# Patient Record
Sex: MALE | Race: WHITE | NOT HISPANIC OR LATINO | Employment: FULL TIME | URBAN - METROPOLITAN AREA
[De-identification: names, ages, dates, MRNs, and addresses within clinical notes are randomized per-mention and may not be internally consistent; named-entity substitution may affect disease eponyms.]

---

## 2021-01-11 ENCOUNTER — OFFICE VISIT (OUTPATIENT)
Dept: FAMILY MEDICINE CLINIC | Facility: CLINIC | Age: 51
End: 2021-01-11

## 2021-01-11 VITALS
HEART RATE: 77 BPM | TEMPERATURE: 97.2 F | SYSTOLIC BLOOD PRESSURE: 128 MMHG | OXYGEN SATURATION: 94 % | WEIGHT: 315 LBS | DIASTOLIC BLOOD PRESSURE: 82 MMHG

## 2021-01-11 DIAGNOSIS — Z29.9 PREVENTIVE MEASURE: ICD-10-CM

## 2021-01-11 DIAGNOSIS — Z12.5 ENCOUNTER FOR SCREENING FOR MALIGNANT NEOPLASM OF PROSTATE: ICD-10-CM

## 2021-01-11 DIAGNOSIS — Z00.01 ENCOUNTER FOR WELL ADULT EXAM WITH ABNORMAL FINDINGS: Primary | ICD-10-CM

## 2021-01-11 PROCEDURE — 99386 PREV VISIT NEW AGE 40-64: CPT | Performed by: FAMILY MEDICINE

## 2021-01-11 RX ORDER — ALBUTEROL SULFATE 90 UG/1
AEROSOL, METERED RESPIRATORY (INHALATION)
COMMUNITY
Start: 2020-11-02 | End: 2021-01-11 | Stop reason: SDUPTHER

## 2021-01-11 RX ORDER — ALBUTEROL SULFATE 90 UG/1
2 AEROSOL, METERED RESPIRATORY (INHALATION) EVERY 4 HOURS PRN
Qty: 18 G | Refills: 2 | Status: SHIPPED | OUTPATIENT
Start: 2021-01-11

## 2021-01-11 NOTE — PROGRESS NOTES
Subjective   Max Dougherty is a 50 y.o. male.     He is in as a new patient. He has not had a physician for about 3 years. He has a history of asthma. He has been previously well controlled on Advair and occasional albuterol. He has not had any Advair for over a year. He has not had any chest pain but he has needed more albuterol without the Advair. He has not had any fever or illness. He has not had any issue with bowel or bladder function, but he has not had a colonoscopy, either. He has not had any issue with dizziness or lightheadedness. He has not had any issue with sleep or mood.          /82 (BP Location: Left arm, Patient Position: Sitting, Cuff Size: Large Adult)   Pulse 77   Temp 97.2 °F (36.2 °C) (Temporal)   Wt (!) 159 kg (351 lb)   SpO2 94%       Chief Complaint   Patient presents with   • Med Refill     refills on both inhalers           Current Outpatient Medications:   •  albuterol sulfate  (90 Base) MCG/ACT inhaler, , Disp: , Rfl:   •  fluticasone-salmeterol (Advair Diskus) 100-50 MCG/DOSE DISKUS, ADVAIR DISKUS 100-50 MCG/DOSE AEPB, Disp: , Rfl:         The following portions of the patient's history were reviewed and updated as appropriate: allergies, current medications, past family history, past medical history, past social history, past surgical history and problem list.    Review of Systems   Constitutional: Negative for activity change, fatigue and fever.   HENT: Negative for congestion, sinus pressure, sinus pain, sore throat and trouble swallowing.    Eyes: Negative for visual disturbance.   Respiratory: Negative for chest tightness, shortness of breath and wheezing.    Cardiovascular: Negative for chest pain.   Gastrointestinal: Negative for abdominal distention, abdominal pain, constipation, diarrhea, nausea and vomiting.   Genitourinary: Negative for difficulty urinating and dysuria.   Musculoskeletal: Negative for back pain and neck pain.   Neurological: Negative for  dizziness, syncope and light-headedness.   Psychiatric/Behavioral: Negative for agitation, hallucinations, sleep disturbance and suicidal ideas.       Objective   Physical Exam  Vitals signs and nursing note reviewed.   Constitutional:       Appearance: He is obese.   HENT:      Right Ear: Tympanic membrane and ear canal normal.      Left Ear: Tympanic membrane and ear canal normal.      Mouth/Throat:      Pharynx: Oropharynx is clear. No oropharyngeal exudate.   Neck:      Musculoskeletal: Neck supple.   Cardiovascular:      Rate and Rhythm: Normal rate and regular rhythm.      Heart sounds: Normal heart sounds.   Pulmonary:      Effort: Pulmonary effort is normal.      Breath sounds: No wheezing or rales.   Abdominal:      General: Bowel sounds are normal.      Palpations: Abdomen is soft.      Tenderness: There is no abdominal tenderness. There is no guarding.   Musculoskeletal:         General: No swelling.      Right lower leg: No edema.      Left lower leg: No edema.   Skin:     Findings: No rash.   Neurological:      General: No focal deficit present.      Mental Status: He is alert and oriented to person, place, and time.      Gait: Gait normal.   Psychiatric:         Mood and Affect: Mood normal.           Assessment/Plan   Problems Addressed this Visit     None      Visit Diagnoses     Encounter for well adult exam with abnormal findings    -  Primary    Relevant Orders    Comprehensive metabolic panel    Lipid panel    Body mass index (BMI) of 40.0 to 44.9 in adult (CMS/[facility])        Relevant Orders    Ambulatory Referral to Sleep Medicine    Preventive measure        Relevant Orders    Ambulatory Referral For Screening Colonoscopy    Encounter for screening for malignant neoplasm of prostate        Relevant Orders    PSA SCREENING      Diagnoses       Codes Comments    Encounter for well adult exam with abnormal findings    -  Primary ICD-10-CM: Z00.01  ICD-9-CM: V70.0     Body mass index (BMI) of 40.0 to  44.9 in adult (CMS/HCC)     ICD-10-CM: Z68.41  ICD-9-CM: V85.41     Preventive measure     ICD-10-CM: Z29.9  ICD-9-CM: V07.9     Encounter for screening for malignant neoplasm of prostate     ICD-10-CM: Z12.5  ICD-9-CM: V76.44           I will get him back on his Advair and albuterol  I will get him a colonoscopy   I will update fasting labs  I will see back in a year, sooner if needed  We discussed diet and lifestyle / exercise changes that he needs to implement now and he was educated on proper techniques  He was advised to get Covid vaccine when offered

## 2021-01-15 ENCOUNTER — LAB (OUTPATIENT)
Dept: FAMILY MEDICINE CLINIC | Facility: CLINIC | Age: 51
End: 2021-01-15

## 2021-01-15 LAB
ALBUMIN SERPL-MCNC: 4.7 G/DL (ref 3.5–5.2)
ALBUMIN/GLOB SERPL: 1.9 G/DL
ALP SERPL-CCNC: 82 U/L (ref 39–117)
ALT SERPL W P-5'-P-CCNC: 27 U/L (ref 1–41)
ANION GAP SERPL CALCULATED.3IONS-SCNC: 8.5 MMOL/L (ref 5–15)
AST SERPL-CCNC: 19 U/L (ref 1–40)
BILIRUB SERPL-MCNC: 0.6 MG/DL (ref 0–1.2)
BUN SERPL-MCNC: 17 MG/DL (ref 6–20)
BUN/CREAT SERPL: 18.3 (ref 7–25)
CALCIUM SPEC-SCNC: 9.1 MG/DL (ref 8.6–10.5)
CHLORIDE SERPL-SCNC: 103 MMOL/L (ref 98–107)
CHOLEST SERPL-MCNC: 168 MG/DL (ref 0–200)
CO2 SERPL-SCNC: 30.5 MMOL/L (ref 22–29)
CREAT SERPL-MCNC: 0.93 MG/DL (ref 0.76–1.27)
GFR SERPL CREATININE-BSD FRML MDRD: 86 ML/MIN/1.73
GLOBULIN UR ELPH-MCNC: 2.5 GM/DL
GLUCOSE SERPL-MCNC: 114 MG/DL (ref 65–99)
HDLC SERPL-MCNC: 47 MG/DL (ref 40–60)
LDLC SERPL CALC-MCNC: 99 MG/DL (ref 0–100)
LDLC/HDLC SERPL: 2.05 {RATIO}
POTASSIUM SERPL-SCNC: 4.7 MMOL/L (ref 3.5–5.2)
PROT SERPL-MCNC: 7.2 G/DL (ref 6–8.5)
PSA SERPL-MCNC: 0.26 NG/ML (ref 0–4)
SODIUM SERPL-SCNC: 142 MMOL/L (ref 136–145)
TRIGL SERPL-MCNC: 124 MG/DL (ref 0–150)
VLDLC SERPL-MCNC: 22 MG/DL (ref 5–40)

## 2021-01-15 PROCEDURE — 36415 COLL VENOUS BLD VENIPUNCTURE: CPT | Performed by: FAMILY MEDICINE

## 2021-01-15 PROCEDURE — 80053 COMPREHEN METABOLIC PANEL: CPT | Performed by: FAMILY MEDICINE

## 2021-01-15 PROCEDURE — 80061 LIPID PANEL: CPT | Performed by: FAMILY MEDICINE

## 2021-01-15 PROCEDURE — G0103 PSA SCREENING: HCPCS | Performed by: FAMILY MEDICINE

## 2021-03-04 ENCOUNTER — TELEPHONE (OUTPATIENT)
Dept: FAMILY MEDICINE CLINIC | Facility: CLINIC | Age: 51
End: 2021-03-04

## 2021-03-04 ENCOUNTER — OFFICE VISIT (OUTPATIENT)
Dept: FAMILY MEDICINE CLINIC | Facility: CLINIC | Age: 51
End: 2021-03-04

## 2021-03-04 VITALS
WEIGHT: 315 LBS | DIASTOLIC BLOOD PRESSURE: 87 MMHG | SYSTOLIC BLOOD PRESSURE: 133 MMHG | TEMPERATURE: 97.1 F | HEART RATE: 77 BPM | OXYGEN SATURATION: 96 %

## 2021-03-04 DIAGNOSIS — S86.111A GASTROCNEMIUS MUSCLE TEAR, RIGHT, INITIAL ENCOUNTER: Primary | ICD-10-CM

## 2021-03-04 PROCEDURE — 99213 OFFICE O/P EST LOW 20 MIN: CPT | Performed by: FAMILY MEDICINE

## 2021-03-04 RX ORDER — HYDROCODONE BITARTRATE AND ACETAMINOPHEN 7.5; 325 MG/1; MG/1
1 TABLET ORAL EVERY 6 HOURS PRN
Qty: 30 TABLET | Refills: 0 | Status: SHIPPED | OUTPATIENT
Start: 2021-03-04

## 2021-03-04 NOTE — TELEPHONE ENCOUNTER
PATIENT HAS HAD A RIGHT CALF INJURY THAT IS SORE AND CANNOT HANDLE PRESSURE. PATIENT STATES THAT [employer]ES DO NOT WORK FOR HIM AS THEY MAKE HIS UNDERARMS SORE, AND THE BOOT HE WORE WAS AWKWARD AND DID NOT TAKE ENOUGH PRESSURE OFF OF THE LEG. 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 PATIENT WOULD LIKE A FOLLOW-UP CALL FROM CLINICAL STAFF ASAP TO DISCUSS THIS ISSUE. PATIENT DOES NOT WANT TO SCHEDULE AN APPOINTMENT TO BE SEEN UNTIL HE SPEAKS TO STAFF. HE CAN BE REACHED [phone].

## 2021-03-04 NOTE — TELEPHONE ENCOUNTER
I cannot get him one of those without documenting  Sounds like he needs to see ortho for this and I can try to get him an urgent appt if he is willing

## 2021-03-04 NOTE — PROGRESS NOTES
Subjective   Max Dougherty is a 50 y.o. male.     He is in with some pain in his right calf for the last week.  He was coaching some youngsters in volleyball.  He went to demonstrate a Dig and felt something pop or pull in his right calf.  It has become more painful over the last few days.  He has noticed some swelling.  He has not had any prior injury to that leg.  He has not had any discoloration to the leg.  He can stand and he can walk but his walk is greatly limited and it is with pain.  He does not have any problem with his left leg at the moment.         /87 (BP Location: Left arm, Patient Position: Sitting, Cuff Size: Large Adult)   Pulse 77   Temp 97.1 °F (36.2 °C) (Temporal)   Wt (!) 158 kg (348 lb)   SpO2 96%       Chief Complaint   Patient presents with   • Knee Injury     needs a scooter & referral            Current Outpatient Medications:   •  albuterol sulfate  (90 Base) MCG/ACT inhaler, Inhale 2 puffs Every 4 (Four) Hours As Needed for Wheezing., Disp: 18 g, Rfl: 2  •  fluticasone-salmeterol (Advair Diskus) 100-50 MCG/DOSE DISKUS, Inhale 1 puff 2 (Two) Times a Day., Disp: 60 each, Rfl: 3        The following portions of the patient's history were reviewed and updated as appropriate: allergies, current medications, past family history, past medical history, past social history, past surgical history and problem list.    Review of Systems   Unable to perform ROS: Acuity of condition       Objective   Physical Exam  Vitals signs and nursing note reviewed.   Musculoskeletal:         General: Swelling (Right calf) and signs of injury present.      Right lower leg: No edema.      Left lower leg: No edema.   Skin:     General: Skin is warm.      Findings: No rash.   Neurological:      General: No focal deficit present.      Mental Status: He is alert and oriented to person, place, and time.           Assessment/Plan   Problems Addressed this Visit     None      Visit Diagnoses      Gastrocnemius muscle tear, right, initial encounter    -  Primary    Relevant Medications    HYDROcodone-acetaminophen (Norco) 7.5-325 MG per tablet    Other Relevant Orders    Ambulatory Referral to Orthopedic Surgery    MRI Angiogram Lower Extremity Right Without Contrast      Diagnoses       Codes Comments    Gastrocnemius muscle tear, right, initial encounter    -  Primary ICD-10-CM: S86.111A  ICD-9-CM: 844.8                I suspect a tear in his right gastrocnemius  I will attempt to get an MRI and will send him to our orthopedic group as urgently as I can  I prescribed a knee scooter so he can be off of that leg  I did give him some pain medication  He is to call for any new concerns or if he feels he needs a second opinion

## 2021-03-10 ENCOUNTER — TRANSCRIBE ORDERS (OUTPATIENT)
Dept: FAMILY MEDICINE CLINIC | Facility: CLINIC | Age: 51
End: 2021-03-10

## 2021-03-10 DIAGNOSIS — S86.111A GASTROCNEMIUS MUSCLE TEAR, RIGHT, INITIAL ENCOUNTER: Primary | ICD-10-CM

## 2021-03-19 ENCOUNTER — OFFICE VISIT (OUTPATIENT)
Dept: ORTHOPEDIC SURGERY | Facility: CLINIC | Age: 51
End: 2021-03-19

## 2021-03-19 VITALS
HEIGHT: 77 IN | HEART RATE: 71 BPM | BODY MASS INDEX: 37.19 KG/M2 | DIASTOLIC BLOOD PRESSURE: 84 MMHG | WEIGHT: 315 LBS | SYSTOLIC BLOOD PRESSURE: 130 MMHG

## 2021-03-19 DIAGNOSIS — S86.111A GASTROCNEMIUS MUSCLE RUPTURE, RIGHT, INITIAL ENCOUNTER: Primary | ICD-10-CM

## 2021-03-19 PROCEDURE — 99203 OFFICE O/P NEW LOW 30 MIN: CPT | Performed by: FAMILY MEDICINE

## 2021-03-19 NOTE — PROGRESS NOTES
"Primary Care Sports Medicine Office Visit Note     Patient ID: Max Dougherty is a 50 y.o. male.    Chief Complaint:  Chief Complaint   Patient presents with   • Right Lower Leg - Pain     HPI:    Mr. Max Dougherty is a 50 y.o. male who presents to the clinic today for gastrocnemius muscle tear. Pt is the  of [employer]. 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 Moderate achy pain. Immediately painful. Difficult to walk that day, and the next. Was then seen by his PCP who ordered MRI for further evaluation. See MRI results below. Today, pain and tenderness improved. Feels function/strength is near 80%.     Past Medical History:   Diagnosis Date   • Asthma        No past surgical history on file.    Family History   Problem Relation Age of Onset   • Allergies Father      Social History     Occupational History   • Not on file   Tobacco Use   • Smoking status: Never Smoker   • Smokeless tobacco: Never Used   Substance and Sexual Activity   • Alcohol use: Yes   • Drug use: Never   • Sexual activity: Defer      Review of Systems   Constitutional: Negative for activity change and fever.   Respiratory: Negative for cough and shortness of breath.    Cardiovascular: Negative for chest pain.   Gastrointestinal: Negative for constipation, diarrhea, nausea and vomiting.   Musculoskeletal: Positive for arthralgias.   Skin: Negative for color change and rash.   Neurological: Negative for weakness.   Hematological: Does not bruise/bleed easily.       Objective:    /84   Pulse 71   Ht 194.9 cm (76.75\")   Wt (!) 158 kg (348 lb)   BMI 41.54 kg/m²     Physical Examination:  Physical Exam  Vitals and nursing note reviewed.   Constitutional:       General: He is not in acute distress.     Appearance: He is well-developed. He is not diaphoretic.   HENT:      Head: Normocephalic and atraumatic.   Eyes:      Conjunctiva/sclera: " "Conjunctivae normal.   Pulmonary:      Effort: Pulmonary effort is normal. No respiratory distress.   Skin:     General: Skin is warm.      Capillary Refill: Capillary refill takes less than 2 seconds.   Neurological:      Mental Status: He is alert.       Right Ankle Exam     Tenderness   Right ankle tenderness location: Moderate tenderness palpation to the medial head of the right proximal and mid muscle belly/myotendinous junction of gastrocnemius.  Swelling: mild    Range of Motion   Dorsiflexion: normal   Plantar flexion: normal   Eversion: normal   Inversion: normal     Muscle Strength   Dorsiflexion:  5/5  Plantar flexion:  4/5  Anterior tibial:  5/5  Posterior tibial:  5/5    Tests   Anterior drawer: negative  Varus tilt: negative    Other   Erythema: absent  Sensation: normal  Pulse: present         Imaging and other tests:  MRI tib-fib dated 3/16/2021 yields the following IMPRESSION:  1 partial thickness tearing of the medial gastrocnemius muscle with an intra-articular muscle hematoma.  Partial-thickness tearing and less pronounced along the deep medial aspect of the lateral gastrocnemius muscle.  2.  Achilles tendinosis.  No evidence of Achilles tendon disruption.    Assessment and Plan:    1. Gastrocnemius muscle rupture, right, initial encounter    I discussed MRI findings with the patient today, and gave him all treatment options.  He is very interested in platelet rich plasma injection into hematoma for potential and muscle healing.  He understands that this will not likely completely heal back is large (2.3 x 2.7 cm tear).  He would like to go home and educate himself on this injection.  Otherwise, ice, elevate, hold anti-inflammatories with known hematoma.  RTC as needed for PRP injection if he would like.    Gideon COLLADO \"Chance\" Waldemar INFANTE DO, CAQSM  03/22/21  17:16 EDT    Disclaimer: Please note that areas of this note were completed with computer voice recognition software.  Quite often " unanticipated grammatical, syntax, homophones, and other interpretive errors are inadvertently transcribed by the computer software. Please excuse any errors that have escaped final proofreading.

## 2021-03-29 ENCOUNTER — TELEPHONE (OUTPATIENT)
Dept: ORTHOPEDIC SURGERY | Facility: CLINIC | Age: 51
End: 2021-03-29

## 2021-03-29 NOTE — TELEPHONE ENCOUNTER
Caller: PATIENT      Relationship to patient: SELF      Best call back number:  [phone]       Type of visit: PRP INJECTIONS      Additional notes:PT. STATES THAT DR. MAC TOLD HIM TO CALL BACK TO LET HIM KNOW THAT HE IS READY TO GET SCHEDULED FOR PRP INJECTIONS.   PLEASE CALL TO ADVISE.

## 2021-04-02 ENCOUNTER — OFFICE VISIT (OUTPATIENT)
Dept: ORTHOPEDIC SURGERY | Facility: CLINIC | Age: 51
End: 2021-04-02

## 2021-04-02 VITALS
DIASTOLIC BLOOD PRESSURE: 76 MMHG | HEART RATE: 88 BPM | SYSTOLIC BLOOD PRESSURE: 133 MMHG | HEIGHT: 77 IN | BODY MASS INDEX: 37.19 KG/M2 | WEIGHT: 315 LBS

## 2021-04-02 DIAGNOSIS — S86.111D GASTROCNEMIUS TEAR, RIGHT, SUBSEQUENT ENCOUNTER: Primary | ICD-10-CM

## 2021-04-02 PROCEDURE — 0232T NJX PLATELET PLASMA: CPT | Performed by: FAMILY MEDICINE

## 2021-04-05 NOTE — PROGRESS NOTES
"Primary Care Sports Medicine Office Visit Note     Patient ID: Max Dougherty is a 50 y.o. male.    Chief Complaint:  Chief Complaint   Patient presents with   • Right Lower Leg - Follow-up     PRP INJ     HPI:    Mr. Max Dougherty is a 50 y.o. male who returns to the clinic today for PRP injection.  Please see previous note for further details.  No new injury, trauma, or change in symptomatology to gastrocnemius musculature since previous evaluation.    Past Medical History:   Diagnosis Date   • Asthma        History reviewed. No pertinent surgical history.    Family History   Problem Relation Age of Onset   • Allergies Father      Social History     Occupational History   • Not on file   Tobacco Use   • Smoking status: Never Smoker   • Smokeless tobacco: Never Used   Substance and Sexual Activity   • Alcohol use: Yes   • Drug use: Never   • Sexual activity: Defer      Review of Systems   Musculoskeletal: Positive for myalgias.       Objective:    /76 (BP Location: Left arm, Patient Position: Sitting, Cuff Size: Large Adult)   Pulse 88   Ht 194.9 cm (76.75\")   Wt (!) 158 kg (348 lb)   BMI 41.54 kg/m²     Physical Examination:  Physical Exam  Vitals and nursing note reviewed.   Constitutional:       General: He is not in acute distress.     Appearance: He is well-developed. He is not diaphoretic.   HENT:      Head: Normocephalic and atraumatic.   Eyes:      Conjunctiva/sclera: Conjunctivae normal.   Pulmonary:      Effort: Pulmonary effort is normal. No respiratory distress.   Skin:     General: Skin is warm.      Capillary Refill: Capillary refill takes less than 2 seconds.   Neurological:      Mental Status: He is alert.       Right Ankle Exam     Range of Motion   Dorsiflexion: normal   Plantar flexion: normal     Muscle Strength   Posterior tibial:  5/5  Gastrocsoleus:  5/5  Peroneal muscle:  5/5    Other   Erythema: absent  Sensation: normal  Pulse: present     Comments:  Moderate bruising to the " "area of the myotendinous junction of gastrocnemius, medial head.  No tenderness palpation distally to the Achilles tendon.  No palpable mass or defect in the tendon itself.  There is a moderate fluid collection and palpable defect of the medial gastroc musculature.          Imaging and other tests:  Please see procedure note for ultrasound findings.  Assessment and Plan:    1. Gastrocnemius tear, right, subsequent encounter    PRP injection was performed under ultrasound guidance today.  No pain or problems, tolerated well by the patient.  We discussed no anti-inflammatories for the next 4 to 6 weeks.  RTC in 1 month for reevaluation with ultrasound.    Gideon COLLADO \"Chance\" Waldemar INFANTE DO, CAQSM  04/05/21  16:48 EDT    Disclaimer: Please note that areas of this note were completed with computer voice recognition software.  Quite often unanticipated grammatical, syntax, homophones, and other interpretive errors are inadvertently transcribed by the computer software. Please excuse any errors that have escaped final proofreading.  "

## 2021-04-05 NOTE — PROGRESS NOTES
"Procedure   Injection Tendon or Ligament    Date/Time: 4/5/2021 4:49 PM  Performed by: Gideon Medeiros II, DO  Authorized by: Gideon Medeiros II, DO   Preparation: Patient was prepped and draped in the usual sterile fashion.  Local anesthesia used: no    Anesthesia:  Local anesthesia used: no    Sedation:  Patient sedated: no    Comments: After discussing risk and benefits, the patient was identified, and the right lower extremity was prepped and draped in usual fashion.  Alcohol and Betadine was used to cleanse the skin.  Ethyl chloride was used for skin anesthesia, and a 22-gauge needle was used to infiltrate the myotendinous junction of the medial gastrocnemius musculature.  Ultrasound was used to visualize the defect.  Defect measured 5.5 x 10.64 cm.  Under direct visualization the 22-gauge needle was used to infiltrate the area of maximal architectural disruption.  5 cc of PRP was then injected under visualization.  Images were saved in the ultrasound machine.  The needle was then removed.  Blood loss negligible.  A bandage was placed.  The patient tolerated procedure incredibly well and had no immediate complications or problems.  RTC in 1 month. Again, discuss no NSAIDs for the next 90 days.     Gideon COLLADO \"Chance\" Waldemar INFANTE DO, CAQSM  04/05/21  16:49 EDT               "

## 2021-05-07 ENCOUNTER — OFFICE VISIT (OUTPATIENT)
Dept: ORTHOPEDIC SURGERY | Facility: CLINIC | Age: 51
End: 2021-05-07

## 2021-05-07 VITALS — WEIGHT: 315 LBS | BODY MASS INDEX: 37.19 KG/M2 | HEIGHT: 77 IN

## 2021-05-07 DIAGNOSIS — S86.111D GASTROCNEMIUS TEAR, RIGHT, SUBSEQUENT ENCOUNTER: Primary | ICD-10-CM

## 2021-05-07 PROCEDURE — 99213 OFFICE O/P EST LOW 20 MIN: CPT | Performed by: FAMILY MEDICINE

## 2023-02-07 ENCOUNTER — TELEPHONE (OUTPATIENT)
Dept: FAMILY MEDICINE CLINIC | Facility: CLINIC | Age: 53
End: 2023-02-07
Payer: COMMERCIAL

## 2023-02-07 NOTE — TELEPHONE ENCOUNTER
Spoke to Walt and gave him the information. I did give him [facility] [employer] phone number. He will give them a call.

## 2023-02-07 NOTE — TELEPHONE ENCOUNTER
Caller: Max Dougherty    Relationship: Self    Best call back number: [phone]    What was the call regarding: PATIENT STATES HE DID AN AT HOME SLEEP STUDY AND IT STATES HE DOES HAVE SLEEP APNEA. PATIENT DID RECEIVE A PRESCRIPTION FOR A CPAP MACHINE BUT THE PATIENT STATES HE HAS A DEVIATED SEPTUM AND IS MORE OF A MOUTH BREATHER THAN A NOSE BREATHER. PATIENT WOULD LIKE TO KNOW IF HE NEEDS TO GET THE DEVIATED SEPTUM LOOKED AT FIRST OR SHOULD HE GET THE CPAP MACHINE.    PLEASE CALL AND ADVISE     Do you require a callback: WENDI

## 2023-10-25 RX ORDER — ALBUTEROL SULFATE 90 UG/1
2 AEROSOL, METERED RESPIRATORY (INHALATION) EVERY 4 HOURS PRN
Qty: 18 G | Refills: 0 | Status: SHIPPED | OUTPATIENT
Start: 2023-10-25

## 2023-10-25 NOTE — TELEPHONE ENCOUNTER
"Caller: Max Dougherty \"Walt\"    Relationship: Self    Best call back number: [phone]     Requested Prescriptions:   Requested Prescriptions     Pending Prescriptions Disp Refills    albuterol sulfate  (90 Base) MCG/ACT inhaler 18 g 2     Sig: Inhale 2 puffs Every 4 (Four) Hours As Needed for Wheezing.        Pharmacy where request should be sent: [id number] - [place], [place] - [address] [facility] [address] - [phone]  - [phone]      Last office visit with prescribing clinician: Visit date not found   Last telemedicine visit with prescribing clinician: Visit date not found   Next office visit with prescribing clinician: Visit date not found     Additional details provided by patient:     Does the patient have less than a 3 day supply:  [x] Yes  [] No    Would you like a call back once the refill request has been completed: [] Yes [x] No    If the office needs to give you a call back, can they leave a voicemail: [] Yes [] No    Tom Medrano Rep   10/25/23 12:35 EDT         "

## 2024-01-17 ENCOUNTER — LAB (OUTPATIENT)
Dept: FAMILY MEDICINE CLINIC | Facility: CLINIC | Age: 54
End: 2024-01-17
Payer: COMMERCIAL

## 2024-01-17 ENCOUNTER — OFFICE VISIT (OUTPATIENT)
Dept: FAMILY MEDICINE CLINIC | Facility: CLINIC | Age: 54
End: 2024-01-17
Payer: COMMERCIAL

## 2024-01-17 VITALS
BODY MASS INDEX: 41.2 KG/M2 | DIASTOLIC BLOOD PRESSURE: 80 MMHG | HEART RATE: 87 BPM | SYSTOLIC BLOOD PRESSURE: 132 MMHG | TEMPERATURE: 100.9 F | OXYGEN SATURATION: 95 % | WEIGHT: 315 LBS

## 2024-01-17 DIAGNOSIS — E66.01 MORBID OBESITY WITH BMI OF 40.0-44.9, ADULT: ICD-10-CM

## 2024-01-17 DIAGNOSIS — R05.9 COUGH, UNSPECIFIED TYPE: ICD-10-CM

## 2024-01-17 DIAGNOSIS — Z12.5 ENCOUNTER FOR SPECIAL SCREENING EXAMINATION FOR NEOPLASM OF PROSTATE: Primary | ICD-10-CM

## 2024-01-17 DIAGNOSIS — Z12.11 SCREENING FOR COLON CANCER: ICD-10-CM

## 2024-01-17 LAB
ALBUMIN SERPL-MCNC: 4.6 G/DL (ref 3.5–5.2)
ALBUMIN/GLOB SERPL: 1.6 G/DL
ALP SERPL-CCNC: 83 U/L (ref 39–117)
ALT SERPL W P-5'-P-CCNC: 24 U/L (ref 1–41)
ANION GAP SERPL CALCULATED.3IONS-SCNC: 12.3 MMOL/L (ref 5–15)
AST SERPL-CCNC: 27 U/L (ref 1–40)
BASOPHILS # BLD AUTO: 0.03 10*3/MM3 (ref 0–0.2)
BASOPHILS NFR BLD AUTO: 0.6 % (ref 0–1.5)
BILIRUB SERPL-MCNC: 0.7 MG/DL (ref 0–1.2)
BUN SERPL-MCNC: 14 MG/DL (ref 6–20)
BUN/CREAT SERPL: 12.1 (ref 7–25)
CALCIUM SPEC-SCNC: 9.5 MG/DL (ref 8.6–10.5)
CHLORIDE SERPL-SCNC: 98 MMOL/L (ref 98–107)
CHOLEST SERPL-MCNC: 164 MG/DL (ref 0–200)
CO2 SERPL-SCNC: 25.7 MMOL/L (ref 22–29)
CREAT SERPL-MCNC: 1.16 MG/DL (ref 0.76–1.27)
DEPRECATED RDW RBC AUTO: 42.2 FL (ref 37–54)
EGFRCR SERPLBLD CKD-EPI 2021: 74.8 ML/MIN/1.73
EOSINOPHIL # BLD AUTO: 0.06 10*3/MM3 (ref 0–0.4)
EOSINOPHIL NFR BLD AUTO: 1.3 % (ref 0.3–6.2)
ERYTHROCYTE [DISTWIDTH] IN BLOOD BY AUTOMATED COUNT: 13.1 % (ref 12.3–15.4)
EXPIRATION DATE: NORMAL
FLUAV AG UPPER RESP QL IA.RAPID: NOT DETECTED
FLUBV AG UPPER RESP QL IA.RAPID: NOT DETECTED
GLOBULIN UR ELPH-MCNC: 2.8 GM/DL
GLUCOSE SERPL-MCNC: 88 MG/DL (ref 65–99)
HBA1C MFR BLD: 5.9 % (ref 4.8–5.6)
HCT VFR BLD AUTO: 52 % (ref 37.5–51)
HDLC SERPL-MCNC: 42 MG/DL (ref 40–60)
HGB BLD-MCNC: 17.8 G/DL (ref 13–17.7)
IMM GRANULOCYTES # BLD AUTO: 0.05 10*3/MM3 (ref 0–0.05)
IMM GRANULOCYTES NFR BLD AUTO: 1.1 % (ref 0–0.5)
INTERNAL CONTROL: NORMAL
LDLC SERPL CALC-MCNC: 106 MG/DL (ref 0–100)
LDLC/HDLC SERPL: 2.51 {RATIO}
LYMPHOCYTES # BLD AUTO: 0.85 10*3/MM3 (ref 0.7–3.1)
LYMPHOCYTES NFR BLD AUTO: 17.9 % (ref 19.6–45.3)
Lab: NORMAL
MCH RBC QN AUTO: 30 PG (ref 26.6–33)
MCHC RBC AUTO-ENTMCNC: 34.2 G/DL (ref 31.5–35.7)
MCV RBC AUTO: 87.7 FL (ref 79–97)
MONOCYTES # BLD AUTO: 0.58 10*3/MM3 (ref 0.1–0.9)
MONOCYTES NFR BLD AUTO: 12.2 % (ref 5–12)
NEUTROPHILS NFR BLD AUTO: 3.19 10*3/MM3 (ref 1.7–7)
NEUTROPHILS NFR BLD AUTO: 66.9 % (ref 42.7–76)
NRBC BLD AUTO-RTO: 0 /100 WBC (ref 0–0.2)
PLATELET # BLD AUTO: 172 10*3/MM3 (ref 140–450)
PMV BLD AUTO: 9 FL (ref 6–12)
POTASSIUM SERPL-SCNC: 4.4 MMOL/L (ref 3.5–5.2)
PROT SERPL-MCNC: 7.4 G/DL (ref 6–8.5)
PSA SERPL-MCNC: 0.25 NG/ML (ref 0–4)
RBC # BLD AUTO: 5.93 10*6/MM3 (ref 4.14–5.8)
SARS-COV-2 AG UPPER RESP QL IA.RAPID: NOT DETECTED
SODIUM SERPL-SCNC: 136 MMOL/L (ref 136–145)
TRIGL SERPL-MCNC: 83 MG/DL (ref 0–150)
VLDLC SERPL-MCNC: 16 MG/DL (ref 5–40)
WBC NRBC COR # BLD AUTO: 4.76 10*3/MM3 (ref 3.4–10.8)

## 2024-01-17 PROCEDURE — 80053 COMPREHEN METABOLIC PANEL: CPT | Performed by: FAMILY MEDICINE

## 2024-01-17 PROCEDURE — 85025 COMPLETE CBC W/AUTO DIFF WBC: CPT | Performed by: FAMILY MEDICINE

## 2024-01-17 PROCEDURE — 80061 LIPID PANEL: CPT | Performed by: FAMILY MEDICINE

## 2024-01-17 PROCEDURE — 84403 ASSAY OF TOTAL TESTOSTERONE: CPT | Performed by: FAMILY MEDICINE

## 2024-01-17 PROCEDURE — 84402 ASSAY OF FREE TESTOSTERONE: CPT | Performed by: FAMILY MEDICINE

## 2024-01-17 PROCEDURE — 36415 COLL VENOUS BLD VENIPUNCTURE: CPT | Performed by: FAMILY MEDICINE

## 2024-01-17 PROCEDURE — 83036 HEMOGLOBIN GLYCOSYLATED A1C: CPT | Performed by: FAMILY MEDICINE

## 2024-01-17 PROCEDURE — G0103 PSA SCREENING: HCPCS | Performed by: FAMILY MEDICINE

## 2024-01-17 NOTE — PROGRESS NOTES
Subjective   Max Dougherty is a 53 y.o. male.     History of Present Illness  Max Dougherty is in for concerns regarding his weight.  He has not seen me in a couple of years and he has gained significant weight, though he has started to make some strides in losing.  He has been eating a little healthier and has been swimming regularly and he has dropped about 20 pounds.  His daughter is getting  this summer and he would like to lose more.  He has had some borderline blood sugar readings in the past but otherwise has been healthy despite his weight.  In the last 24 hours he has noticed a terrible headache and low-grade fever.  He has had a mild cough but his throat has not been sore.  His appetite has been a little diminished.  He has been a little achy and sore.. There is no history of chest pain or dyspnea. There is no history of issue with bowel or bladder dysfunction. There is no history of dizziness or lightheadedness. There is no history of issue with sleep or mood. There is no history of issue with present medication.       Obesity  Associated symptoms include congestion, fatigue, a fever, headaches and a sore throat. Pertinent negatives include no abdominal pain, chest pain, nausea, neck pain, vomiting or weakness.   Fatigue  Associated symptoms include congestion, fatigue, a fever, headaches and a sore throat. Pertinent negatives include no abdominal pain, chest pain, nausea, neck pain, vomiting or weakness.          /80 (BP Location: Left arm, Patient Position: Sitting, Cuff Size: Large Adult)   Pulse 87   Temp (!) 100.9 °F (38.3 °C) (Temporal) Comment: also did have a hat on but this normal  Wt (!) 157 kg (345 lb 3.2 oz)   SpO2 95%   BMI 41.20 kg/m²       Chief Complaint   Patient presents with    Med Refill    Obesity     He has lost 20 pounds on his own but he is wanting to talk about injection - his daughter is getting  06/1/24 and wants a little help     Fatigue     Feels  drained - headaches pounding           Current Outpatient Medications:     albuterol sulfate  (90 Base) MCG/ACT inhaler, Inhale 2 puffs Every 4 (Four) Hours As Needed for Wheezing., Disp: 18 g, Rfl: 0    fluticasone-salmeterol (Advair Diskus) 100-50 MCG/DOSE DISKUS, Inhale 1 puff 2 (Two) Times a Day., Disp: 60 each, Rfl: 3    BMI cannot be calculated due to outdated height or weight values.  Please input a current height/weight in Vitals and re-renter BMIFOLLOWUP in Note to pull in correct documentation based on BMI range.       The following portions of the patient's history were reviewed and updated as appropriate: allergies, current medications, past family history, past medical history, past social history, past surgical history, and problem list.    Review of Systems   Constitutional:  Positive for appetite change, fatigue and fever. Negative for activity change.   HENT:  Positive for congestion, postnasal drip, sinus pressure and sore throat. Negative for sinus pain and trouble swallowing.    Eyes:  Negative for visual disturbance.   Respiratory:  Negative for chest tightness, shortness of breath and wheezing.    Cardiovascular:  Negative for chest pain.   Gastrointestinal:  Negative for abdominal distention, abdominal pain, constipation, diarrhea, nausea and vomiting.   Genitourinary:  Negative for difficulty urinating and dysuria.   Musculoskeletal:  Negative for back pain and neck pain.   Neurological:  Positive for headaches. Negative for dizziness and weakness.   Psychiatric/Behavioral:  Negative for agitation, hallucinations and suicidal ideas.        Objective   Physical Exam  Vitals and nursing note reviewed.   Constitutional:       Appearance: He is obese.   HENT:      Ears:      Comments: Fluid behind both TMs     Nose: Congestion and rhinorrhea present.      Mouth/Throat:      Pharynx: Oropharynx is clear.   Cardiovascular:      Rate and Rhythm: Normal rate and regular rhythm.      Heart  sounds: Normal heart sounds. No murmur heard.  Pulmonary:      Effort: Pulmonary effort is normal.      Breath sounds: No wheezing or rales.   Abdominal:      General: Bowel sounds are normal.      Palpations: Abdomen is soft.      Tenderness: There is no abdominal tenderness. There is no guarding.   Musculoskeletal:      Cervical back: Neck supple.   Lymphadenopathy:      Cervical: No cervical adenopathy.   Neurological:      Mental Status: He is alert and oriented to person, place, and time. Mental status is at baseline.   Psychiatric:         Mood and Affect: Mood normal.           Assessment & Plan   Problems Addressed this Visit    None  Visit Diagnoses       Encounter for special screening examination for neoplasm of prostate    -  Primary    Relevant Orders    PSA Screen    Morbid obesity with BMI of 40.0-44.9, adult        Relevant Orders    Comprehensive metabolic panel    Lipid panel    Hemoglobin A1c    CBC w AUTO Differential    Testosterone (Free & Total), LC / MS    Screening for colon cancer        Relevant Orders    Ambulatory Referral For Screening Colonoscopy (Completed)          Diagnoses         Codes Comments    Encounter for special screening examination for neoplasm of prostate    -  Primary ICD-10-CM: Z12.5  ICD-9-CM: V76.44     Morbid obesity with BMI of 40.0-44.9, adult     ICD-10-CM: E66.01, Z68.41  ICD-9-CM: 278.01, V85.41     Screening for colon cancer     ICD-10-CM: Z12.11  ICD-9-CM: V76.51           Testing was negative for flu and COVID  I believe he has just a seasonal viral issue  I have ordered labs to make sure he is not diabetic given the significant weight gain and change in his health  I am perfectly willing to try to help him with medical weight loss issues but I need to see his labs first  I will set up a follow-up with him once I have results and we know what we are treating  He is encouraged to continue his current exercise plan and call me if he develops any  difficulties         Answers submitted by the patient for this visit:  Primary Reason for Visit (Submitted on 1/16/2024)  What is the primary reason for your visit?: Other  Other (Submitted on 1/16/2024)  Please describe your symptoms.: None  Have you had these symptoms before?: No  How long have you been having these symptoms?: 1-4 days  Please list any medications you are currently taking for this condition.: N/A  Please describe any probable cause for these symptoms. : N/A

## 2024-01-18 RX ORDER — SEMAGLUTIDE 0.25 MG/.5ML
0.25 INJECTION, SOLUTION SUBCUTANEOUS WEEKLY
Qty: 2 ML | Refills: 0 | Status: SHIPPED | OUTPATIENT
Start: 2024-01-18

## 2024-01-25 ENCOUNTER — TELEPHONE (OUTPATIENT)
Dept: FAMILY MEDICINE CLINIC | Facility: CLINIC | Age: 54
End: 2024-01-25

## 2024-01-25 DIAGNOSIS — D75.1 POLYCYTHEMIA: Primary | ICD-10-CM

## 2024-01-25 NOTE — TELEPHONE ENCOUNTER
"  Caller: Max Dougherty \"Walt\"    Relationship: Self    Best call back number: [phone]     What test was performed: LABS     When was the test performed: 01.17.24    Where was the test performed: IN OFFICE    Additional notes: PLEASE CALL TO DISCUSS.         "

## 2024-01-25 NOTE — TELEPHONE ENCOUNTER
Call returned. Results given. He was having trouble accessing his [employer] account. I have given him the [employer] help desk number to reach out for assistance. He is willing to try Wegovy. He will check on this with the pharmacy. I have informed him this may require authorization through the insurance but I have not received any requests yet. He is willing to see hematology if you will please place referral.

## 2024-01-26 LAB
TESTOST FREE SERPL-MCNC: 4.4 PG/ML (ref 7.2–24)
TESTOST SERPL-MCNC: 222.9 NG/DL (ref 264–916)

## 2024-01-31 RX ORDER — SEMAGLUTIDE 0.25 MG/.5ML
0.25 INJECTION, SOLUTION SUBCUTANEOUS WEEKLY
Qty: 2 ML | Refills: 0 | Status: SHIPPED | OUTPATIENT
Start: 2024-01-31

## 2024-01-31 NOTE — TELEPHONE ENCOUNTER
"    Caller: Max Dougherty \"Walt\"    Relationship: Self    Best call back number: [phone]   Requested Prescriptions:   Requested Prescriptions     Pending Prescriptions Disp Refills    Semaglutide-Weight Management (Wegovy) 0.25 MG/0.5ML solution auto-injector 2 mL 0     Sig: Inject 0.5 mL under the skin into the appropriate area as directed 1 (One) Time Per Week.        Pharmacy where request should be sent: [place], [place] -[phone] [facility] [address] [phone] [place] [phone]      Last office visit with prescribing clinician: 1/17/2024   Last telemedicine visit with prescribing clinician: Visit date not found   Next office visit with prescribing clinician: Visit date not found     Additional details provided by patient: PATIENT STATED THAT [place] HAS THIS MEDICATION IN STOCK AND IT IS THE ONLY PHARMACY PATIENT HAS BEEN ABLE TO FIND IT AT     Would you like a call back once the refill request has been completed: [] Yes [x] No    If the office needs to give you a call back, can they leave a voicemail: [] Yes [x] No    Tom Smith Rep   01/31/24 11:33 EST       "

## 2024-02-28 NOTE — PROGRESS NOTES
HEMATOLOGY ONCOLOGY OUTPATIENT CONSULTATION       Patient name: Max Dougherty  : 1970  MRN: [id number]  Primary Care Physician: Hari Brock MD  Referring Physician: Hari Brock*  Reason For Consult: Erythrocytosis    History of Present Illness:  Patient is a 53 y.o.     3/1/2024: For the first time to investigate erythrocytosis.  He carried a history of untreated sleep apnea.  He was in the process of losing weight and not only was exercising but had been started on semaglutide with the intention of assisting in weight loss.  He had no previous medical history, other than lifelong asthma that was well-controlled and for which he seldom needed any treatment.  He had no history of erythromelalgia or aqua genic pruritus.  He had not had any fevers or nocturnal diaphoresis it was also without early satiety.  He denied abdominal pain.  Had not had hematuria.  On exam chronically ill-appearing.  No distress.  Conversant.  No palpable adenopathy.  Lungs clear.  Heart regular.  Abdomen rounded, protuberant and free of hepatomegaly or splenomegaly.  There is bilateral lower extremity edema.  Laboratory exams reviewed.  Indeed he has erythrocytosis with a hemoglobin of 18.1 and hematocrit of 53.4.  The white cell count and differential as well as a platelet count are unremarkable.  Reviewed the social history and family history.  Discussed with him.  Most likely his erythrocytosis is indeed from the untreated sleep apnea.  If this improves it is very possible that the erythrocytosis will resolve.  Will investigate the possibility of polycythemia vera which seems unlikely based on the clinical picture.    Past Medical History:   Diagnosis Date    Asthma      No past surgical history on file.      Current Outpatient Medications:     albuterol sulfate  (90 Base) MCG/ACT inhaler, Inhale 2 puffs Every 4 (Four) Hours As Needed for Wheezing., Disp: 18 g, Rfl: 0     fluticasone-salmeterol (Advair Diskus) 100-50 MCG/DOSE DISKUS, Inhale 1 puff 2 (Two) Times a Day., Disp: 60 each, Rfl: 3    Semaglutide-Weight Management (Wegovy) 0.25 MG/0.5ML solution auto-injector, Inject 0.5 mL under the skin into the appropriate area as directed 1 (One) Time Per Week., Disp: 2 mL, Rfl: 0    No Known Allergies    Family History   Problem Relation Age of Onset    Allergies Father      Cancer-related family history is not on file.    Social History     Tobacco Use    Smoking status: Never    Smokeless tobacco: Never   Substance Use Topics    Alcohol use: Yes     Alcohol/week: 2.0 standard drinks of alcohol     Types: 2 Shots of liquor per week    Drug use: Never     Social History     Social History Narrative    Not on file     ROS:   Review of Systems   Constitutional:  Negative for activity change, appetite change, chills, diaphoresis, fatigue, fever and unexpected weight change.   HENT:  Negative for congestion, dental problem, drooling, ear discharge, ear pain, facial swelling, hearing loss, mouth sores, nosebleeds, postnasal drip, rhinorrhea, sinus pressure, sinus pain, sneezing, sore throat, tinnitus, trouble swallowing and voice change.    Eyes:  Negative for photophobia, pain, discharge, redness, itching and visual disturbance.   Respiratory:  Negative for apnea, cough, choking, chest tightness, shortness of breath, wheezing and stridor.    Cardiovascular:  Negative for chest pain, palpitations and leg swelling.   Gastrointestinal:  Negative for abdominal distention, abdominal pain, anal bleeding, blood in stool, constipation, diarrhea, nausea, rectal pain and vomiting.   Endocrine: Negative for cold intolerance, heat intolerance, polydipsia and polyuria.   Genitourinary:  Negative for decreased urine volume, difficulty urinating, dysuria, flank pain, frequency, genital sores, hematuria and urgency.   Musculoskeletal:  Negative for arthralgias, back pain, gait problem, joint swelling,  myalgias, neck pain and neck stiffness.   Skin:  Negative for color change, pallor and rash.   Neurological:  Negative for dizziness, tremors, seizures, syncope, facial asymmetry, speech difficulty, weakness, light-headedness, numbness and headaches.   Hematological:  Negative for adenopathy. Does not bruise/bleed easily.   Psychiatric/Behavioral:  Positive for sleep disturbance (Reports waking up many times during the night.  Has daytime somnolence and wakes up often with a headache.). Negative for agitation, behavioral problems, confusion, decreased concentration, hallucinations, self-injury and suicidal ideas. The patient is not nervous/anxious.          Objective:    Vital Signs:  There were no vitals filed for this visit.  There is no height or weight on file to calculate BMI.    ECOG  (0) Fully active, able to carry on all predisease performance without restriction    Physical Exam:   Physical Exam  Constitutional:       General: He is not in acute distress.     Appearance: He is not ill-appearing, toxic-appearing or diaphoretic.      Comments: Chronically ill-appearing, well-built and well-oriented man.  Does not seem in any distress.   HENT:      Head: Normocephalic and atraumatic.      Right Ear: External ear normal.      Left Ear: External ear normal.      Nose: Nose normal.      Mouth/Throat:      Mouth: Mucous membranes are moist.      Pharynx: Oropharynx is clear.   Eyes:      General: No scleral icterus.        Right eye: No discharge.         Left eye: No discharge.      Conjunctiva/sclera: Conjunctivae normal.      Pupils: Pupils are equal, round, and reactive to light.   Cardiovascular:      Rate and Rhythm: Normal rate and regular rhythm.      Pulses: Normal pulses.      Heart sounds: Normal heart sounds. No murmur heard.     No friction rub. No gallop.   Pulmonary:      Effort: No respiratory distress.      Breath sounds: No stridor. No wheezing, rhonchi or rales.   Chest:      Chest wall: No  tenderness.   Abdominal:      General: Bowel sounds are normal. There is no distension.      Palpations: Abdomen is soft. There is no mass.      Tenderness: There is no abdominal tenderness. There is no right CVA tenderness, left CVA tenderness, guarding or rebound.      Comments: Rounded, protuberant and soft.  The liver and spleen are nonenlarged.   Musculoskeletal:         General: No swelling, tenderness, deformity or signs of injury.      Cervical back: No rigidity.      Right lower leg: Edema present.      Left lower leg: Edema present.   Lymphadenopathy:      Cervical: No cervical adenopathy.   Skin:     General: Skin is warm and dry.      Coloration: Skin is not jaundiced.      Findings: No bruising or rash.   Neurological:      General: No focal deficit present.      Mental Status: He is alert and oriented to person, place, and time.      Gait: Gait normal.   Psychiatric:         Mood and Affect: Mood normal.         Behavior: Behavior normal.         Thought Content: Thought content normal.         Judgment: Judgment normal.     TOYA Navarro MD performed the physical exam on 3/1/2024 as documented above.    Lab Results - Last 18 Months   Lab Units 01/17/24  1151   WBC 10*3/mm3 4.76   HEMOGLOBIN g/dL 17.8*   HEMATOCRIT % 52.0*   PLATELETS 10*3/mm3 172   MCV fL 87.7     Lab Results - Last 18 Months   Lab Units 01/17/24  1151   SODIUM mmol/L 136   POTASSIUM mmol/L 4.4   CHLORIDE mmol/L 98   CO2 mmol/L 25.7   BUN mg/dL 14   CREATININE mg/dL 1.16   CALCIUM mg/dL 9.5   BILIRUBIN mg/dL 0.7   ALK PHOS U/L 83   ALT (SGPT) U/L 24   AST (SGOT) U/L 27   GLUCOSE mg/dL 88     Lab Results   Component Value Date    GLUCOSE 88 01/17/2024    BUN 14 01/17/2024    CREATININE 1.16 01/17/2024    EGFRIFNONA 86 01/15/2021    BCR 12.1 01/17/2024    K 4.4 01/17/2024    CO2 25.7 01/17/2024    CALCIUM 9.5 01/17/2024    ALBUMIN 4.6 01/17/2024    AST 27 01/17/2024    ALT 24 01/17/2024     Lab Results   Component Value Date    PSA  0.254 01/17/2024     Assessment & Plan     1.  Erythrocytosis secondary to untreated sleep apnea.  The picture is consistent with this diagnosis.  The possibility of polycythemia vera or other myeloproliferative disorders needs to be considered and will be investigated but seems unlikely given the presentation and symptoms.  2.  History of well-controlled asthma.  3.  Body mass index greater than 42 kg/m².  4.  Reviewed the records including notes from primary care and all laboratory exams available including blood counts and chemistries.  5.  See me in approximately 4 weeks with results of ultrasound of the abdomen as well as laboratory exams.    James Navarro MD on 3/1/2024 at 12:05 PM.

## 2024-03-01 ENCOUNTER — CONSULT (OUTPATIENT)
Dept: ONCOLOGY | Facility: CLINIC | Age: 54
End: 2024-03-01
Payer: COMMERCIAL

## 2024-03-01 ENCOUNTER — LAB (OUTPATIENT)
Dept: LAB | Facility: HOSPITAL | Age: 54
End: 2024-03-01
Payer: COMMERCIAL

## 2024-03-01 VITALS
BODY MASS INDEX: 39.17 KG/M2 | HEART RATE: 63 BPM | HEIGHT: 75 IN | OXYGEN SATURATION: 96 % | TEMPERATURE: 98.1 F | DIASTOLIC BLOOD PRESSURE: 87 MMHG | WEIGHT: 315 LBS | SYSTOLIC BLOOD PRESSURE: 135 MMHG

## 2024-03-01 DIAGNOSIS — D75.1 POLYCYTHEMIA: Primary | ICD-10-CM

## 2024-03-01 DIAGNOSIS — D75.1 POLYCYTHEMIA: ICD-10-CM

## 2024-03-01 LAB
ALBUMIN SERPL-MCNC: 4.4 G/DL (ref 3.5–5.2)
ALBUMIN/GLOB SERPL: 1.6 G/DL
ALP SERPL-CCNC: 102 U/L (ref 39–117)
ALT SERPL W P-5'-P-CCNC: 22 U/L (ref 1–41)
ANION GAP SERPL CALCULATED.3IONS-SCNC: 11 MMOL/L (ref 5–15)
AST SERPL-CCNC: 20 U/L (ref 1–40)
BASOPHILS # BLD AUTO: 0.09 10*3/MM3 (ref 0–0.2)
BASOPHILS NFR BLD AUTO: 1.4 % (ref 0–1.5)
BILIRUB SERPL-MCNC: 0.6 MG/DL (ref 0–1.2)
BUN SERPL-MCNC: 19 MG/DL (ref 6–20)
BUN/CREAT SERPL: 19.4 (ref 7–25)
CALCIUM SPEC-SCNC: 9.6 MG/DL (ref 8.6–10.5)
CHLORIDE SERPL-SCNC: 103 MMOL/L (ref 98–107)
CO2 SERPL-SCNC: 26 MMOL/L (ref 22–29)
CREAT SERPL-MCNC: 0.98 MG/DL (ref 0.76–1.27)
DEPRECATED RDW RBC AUTO: 43.7 FL (ref 37–54)
EGFRCR SERPLBLD CKD-EPI 2021: 91.6 ML/MIN/1.73
EOSINOPHIL # BLD AUTO: 0.17 10*3/MM3 (ref 0–0.4)
EOSINOPHIL NFR BLD AUTO: 2.6 % (ref 0.3–6.2)
ERYTHROCYTE [DISTWIDTH] IN BLOOD BY AUTOMATED COUNT: 13.8 % (ref 12.3–15.4)
GLOBULIN UR ELPH-MCNC: 2.7 GM/DL
GLUCOSE SERPL-MCNC: 85 MG/DL (ref 65–99)
HCT VFR BLD AUTO: 53.4 % (ref 37.5–51)
HGB BLD-MCNC: 18.1 G/DL (ref 13–17.7)
LYMPHOCYTES # BLD AUTO: 2.45 10*3/MM3 (ref 0.7–3.1)
LYMPHOCYTES NFR BLD AUTO: 37.1 % (ref 19.6–45.3)
MCH RBC QN AUTO: 29.8 PG (ref 26.6–33)
MCHC RBC AUTO-ENTMCNC: 33.9 G/DL (ref 31.5–35.7)
MCV RBC AUTO: 88 FL (ref 79–97)
MONOCYTES # BLD AUTO: 0.7 10*3/MM3 (ref 0.1–0.9)
MONOCYTES NFR BLD AUTO: 10.6 % (ref 5–12)
NEUTROPHILS NFR BLD AUTO: 3.19 10*3/MM3 (ref 1.7–7)
NEUTROPHILS NFR BLD AUTO: 48.3 % (ref 42.7–76)
PLATELET # BLD AUTO: 220 10*3/MM3 (ref 140–450)
PMV BLD AUTO: 9.3 FL (ref 6–12)
POTASSIUM SERPL-SCNC: 4.7 MMOL/L (ref 3.5–5.2)
PROT SERPL-MCNC: 7.1 G/DL (ref 6–8.5)
RBC # BLD AUTO: 6.07 10*6/MM3 (ref 4.14–5.8)
SODIUM SERPL-SCNC: 140 MMOL/L (ref 136–145)
WBC NRBC COR # BLD AUTO: 6.6 10*3/MM3 (ref 3.4–10.8)

## 2024-03-01 PROCEDURE — 80053 COMPREHEN METABOLIC PANEL: CPT | Performed by: INTERNAL MEDICINE

## 2024-03-01 PROCEDURE — 36415 COLL VENOUS BLD VENIPUNCTURE: CPT

## 2024-03-01 PROCEDURE — 85025 COMPLETE CBC W/AUTO DIFF WBC: CPT

## 2024-03-04 LAB — EPO SERPL-ACNC: 13.3 MIU/ML (ref 2.6–18.5)

## 2024-03-06 ENCOUNTER — PATIENT ROUNDING (BHMG ONLY) (OUTPATIENT)
Dept: ONCOLOGY | Facility: CLINIC | Age: 54
End: 2024-03-06
Payer: COMMERCIAL

## 2024-03-06 NOTE — PROGRESS NOTES
March 6, 2024    Hello, may I speak with Max Dougherty?    My name is Nara Vitale      I am  with [facility]  [address] [facility] [place] [address] [phone].    Before we get started may I verify your date of birth? 1970    I am calling to officially welcome you to our practice and ask about your recent visit. Is this a good time to talk? no    Tell me about your visit with us. What things went well?  A My Chart message was sent to the patient.         We're always looking for ways to make our patients' experiences even better. Do you have recommendations on ways we may improve?  no    Overall were you satisfied with your first visit to our practice? yes       I appreciate you taking the time to speak with me today. Is there anything else I can do for you? no      Thank you, and have a great day.

## 2024-03-10 LAB
JAK2 P.V617F BLD/T QL: NORMAL
LAB DIRECTOR NAME PROVIDER: NORMAL
LABORATORY COMMENT REPORT: NORMAL

## 2024-05-06 RX ORDER — SEMAGLUTIDE 0.5 MG/.5ML
0.5 INJECTION, SOLUTION SUBCUTANEOUS WEEKLY
Qty: 2 ML | Refills: 0 | Status: SHIPPED | OUTPATIENT
Start: 2024-05-06

## 2025-08-04 ENCOUNTER — OFFICE VISIT (OUTPATIENT)
Dept: ORTHOPEDIC SURGERY | Facility: CLINIC | Age: 55
End: 2025-08-04
Payer: COMMERCIAL

## 2025-08-04 VITALS — WEIGHT: 315 LBS | HEIGHT: 75 IN | BODY MASS INDEX: 39.17 KG/M2 | OXYGEN SATURATION: 95 % | RESPIRATION RATE: 20 BRPM

## 2025-08-04 DIAGNOSIS — M17.11 PRIMARY OSTEOARTHRITIS OF RIGHT KNEE: ICD-10-CM

## 2025-08-04 DIAGNOSIS — M25.561 CHRONIC PAIN OF RIGHT KNEE: Primary | ICD-10-CM

## 2025-08-04 DIAGNOSIS — G89.29 CHRONIC PAIN OF RIGHT KNEE: Primary | ICD-10-CM

## 2025-08-04 PROCEDURE — 99203 OFFICE O/P NEW LOW 30 MIN: CPT | Performed by: FAMILY MEDICINE

## 2025-08-04 RX ORDER — MELOXICAM 15 MG/1
15 TABLET ORAL DAILY PRN
Qty: 30 TABLET | Refills: 4 | Status: SHIPPED | OUTPATIENT
Start: 2025-08-04